# Patient Record
Sex: MALE | Employment: OTHER | URBAN - METROPOLITAN AREA
[De-identification: names, ages, dates, MRNs, and addresses within clinical notes are randomized per-mention and may not be internally consistent; named-entity substitution may affect disease eponyms.]

---

## 2022-08-10 LAB — PSA, EXTERNAL: 6.9

## 2022-09-28 PROBLEM — R97.20 ELEVATED PSA: Status: ACTIVE | Noted: 2022-09-28

## 2022-10-05 ENCOUNTER — OFFICE VISIT (OUTPATIENT)
Dept: UROLOGY | Age: 77
End: 2022-10-05
Payer: MEDICARE

## 2022-10-05 VITALS — BODY MASS INDEX: 28.44 KG/M2 | WEIGHT: 210 LBS | HEIGHT: 72 IN

## 2022-10-05 DIAGNOSIS — R97.20 ELEVATED PSA: ICD-10-CM

## 2022-10-05 DIAGNOSIS — N39.0 URINARY TRACT INFECTION WITH HEMATURIA, SITE UNSPECIFIED: Primary | ICD-10-CM

## 2022-10-05 DIAGNOSIS — R31.9 URINARY TRACT INFECTION WITH HEMATURIA, SITE UNSPECIFIED: Primary | ICD-10-CM

## 2022-10-05 DIAGNOSIS — N40.0 BENIGN PROSTATIC HYPERPLASIA WITHOUT LOWER URINARY TRACT SYMPTOMS: ICD-10-CM

## 2022-10-05 PROBLEM — N52.9 ED (ERECTILE DYSFUNCTION): Status: ACTIVE | Noted: 2022-10-05

## 2022-10-05 LAB
BILIRUB UR QL: NEGATIVE
GLUCOSE UR-MCNC: NEGATIVE MG/DL
KETONES P FAST UR STRIP-MCNC: NEGATIVE MG/DL
PH UR STRIP: 7 [PH] (ref 4.6–8)
PROT UR QL STRIP: NEGATIVE
SP GR UR STRIP: 1.02 (ref 1–1.03)
UA UROBILINOGEN AMB POC: NORMAL (ref 0.2–1)
URINALYSIS CLARITY POC: CLEAR
URINALYSIS COLOR POC: YELLOW
URINE BLOOD POC: NORMAL
URINE LEUKOCYTES POC: NORMAL
URINE NITRITES POC: NEGATIVE

## 2022-10-05 PROCEDURE — 99204 OFFICE O/P NEW MOD 45 MIN: CPT | Performed by: UROLOGY

## 2022-10-05 PROCEDURE — 81003 URINALYSIS AUTO W/O SCOPE: CPT | Performed by: UROLOGY

## 2022-10-05 RX ORDER — ATORVASTATIN CALCIUM 10 MG/1
20 TABLET, FILM COATED ORAL DAILY
COMMUNITY

## 2022-10-05 RX ORDER — HYDROCHLOROTHIAZIDE 12.5 MG/1
12.5 CAPSULE ORAL DAILY
COMMUNITY

## 2022-10-05 RX ORDER — LISINOPRIL 40 MG/1
40 TABLET ORAL DAILY
COMMUNITY

## 2022-10-05 RX ORDER — AMLODIPINE BESYLATE 10 MG/1
TABLET ORAL DAILY
COMMUNITY

## 2022-10-05 RX ORDER — CARVEDILOL 25 MG/1
25 TABLET ORAL 2 TIMES DAILY WITH MEALS
COMMUNITY

## 2022-10-05 RX ORDER — ENEMA 19; 7 G/133ML; G/133ML
1 ENEMA RECTAL
Qty: 133 ML | Refills: 0 | Status: SHIPPED | OUTPATIENT
Start: 2022-10-05 | End: 2022-10-05

## 2022-10-05 RX ORDER — CIPROFLOXACIN 500 MG/1
500 TABLET ORAL 2 TIMES DAILY
Qty: 10 TABLET | Refills: 0 | Status: SHIPPED | OUTPATIENT
Start: 2022-10-05

## 2022-10-05 NOTE — PROGRESS NOTES
HISTORY OF PRESENT ILLNESS  Tim Lassiter is a 78 y.o. male. has a past medical history of Hypercholesterolemia and Hypertension. has a past surgical history that includes hx heent. Chief Complaint   Patient presents with    New Patient    Elevated PSA     He is [facility] in [facility] [place] for an elevated PSA. Notes were reviewed from 8/10/2022. He had annual wellness exam.  PSA was checked for routine screening. IPSS 3/3. Chronic Conditions Addressed Today       1. Elevated PSA     Overview      8/10/22 PSA 6.9  He reports PSA 5.1 1/2022. Current Assessment & Plan      Elevated PSA. He needs evaluation. We will plan a MRI. He will need a biopsy afterward. The patient was counseled on the risks, benefits and expected course of the procedure. He wished to proceed. Relevant Medications     hydroCHLOROthiazide (MICROZIDE) 12.5 mg capsule     lisinopriL (PRINIVIL, ZESTRIL) 40 mg tablet    2. Benign prostatic hyperplasia without lower urinary tract symptoms     Current Assessment & Plan       IPSS 3/3. Some double voiding in the morning. Relevant Medications     hydroCHLOROthiazide (MICROZIDE) 12.5 mg capsule     lisinopriL (PRINIVIL, ZESTRIL) 40 mg tablet     Other Relevant Orders     AMB POC URINALYSIS DIP STICK AUTO W/O MICRO     MRI PELV W WO CONT       Past Medical History:    PMHx (including negatives):  has a past medical history of Hypercholesterolemia and Hypertension. PSurgHx:  has a past surgical history that includes hx heent. PSocHx:  reports that he has quit smoking. His smoking use included cigarettes. He has never used smokeless tobacco. He reports current alcohol use of about 14.0 standard drinks per week. He reports that he does not use drugs. FamilyHX:   Family History   Problem Relation Age of Onset    Cancer Sister      Review of Systems   All other systems reviewed and are negative.   Physical Exam  Constitutional: General: He is not in acute distress. Appearance: Normal appearance. HENT:      Head: Normocephalic and atraumatic. Eyes:      Extraocular Movements: Extraocular movements intact. Pupils: Pupils are equal, round, and reactive to light. Cardiovascular:      Rate and Rhythm: Normal rate and regular rhythm. Pulmonary:      Effort: Pulmonary effort is normal. No respiratory distress. Breath sounds: Normal breath sounds. No wheezing or rhonchi. Genitourinary:     Penis: Normal. No phimosis, hypospadias or tenderness. Testes: Normal.         Right: Mass, tenderness or swelling not present. Left: Mass, tenderness or swelling not present. Epididymis:      Right: Normal. No mass or tenderness. Left: Normal. No mass or tenderness. Prostate: Enlarged. Not tender and no nodules present (2-3+ benign, only lower half palpable). Rectum: Normal.   Musculoskeletal:         General: Normal range of motion. Lymphadenopathy:      Cervical: No cervical adenopathy. Upper Body:      Right upper body: No supraclavicular adenopathy. Left upper body: No supraclavicular adenopathy. Skin:     General: Skin is warm and dry. Neurological:      General: No focal deficit present. Mental Status: He is alert and oriented to person, place, and time. Psychiatric:         Mood and Affect: Mood normal.         Behavior: Behavior normal.     No Known Allergies   Prior to Admission medications    Medication Sig Start Date End Date Taking? Authorizing Provider   hydroCHLOROthiazide (MICROZIDE) 12.5 mg capsule Take 12.5 mg by mouth daily. Yes Provider, Historical   amLODIPine (NORVASC) 10 mg tablet Take  by mouth daily. Yes Provider, Historical   carvediloL (COREG) 25 mg tablet Take 25 mg by mouth two (2) times daily (with meals). Yes Provider, Historical   lisinopriL (PRINIVIL, ZESTRIL) 40 mg tablet Take 40 mg by mouth daily.    Yes Provider, Historical   atorvastatin (LIPITOR) 10 mg tablet Take 20 mg by mouth daily. Yes Provider, Historical   ciprofloxacin HCl (CIPRO) 500 mg tablet Take 1 Tablet by mouth two (2) times a day. Start the day before the procedure. 10/5/22  Yes Mahendra Martínez MD   sodium phosphate (Fleet Enema) 19-7 gram/118 mL enema Insert 1 Enema into rectum now for 1 dose. Use 1 to 4 hrs prior to the procedure. 10/5/22 10/5/22 Yes Mahendra Martínez MD        ASSESSMENT and PLAN  Diagnoses and all orders for this visit:    1. Elevated PSA  Assessment & Plan:  Elevated PSA. He needs evaluation. We will plan a MRI. He will need a biopsy afterward. The patient was counseled on the risks, benefits and expected course of the procedure. He wished to proceed. 2. Benign prostatic hyperplasia without lower urinary tract symptoms  Assessment & Plan:   IPSS 3/3. Some double voiding in the morning. Orders:  -     AMB POC URINALYSIS DIP STICK AUTO W/O MICRO  -     MRI PELV W WO CONT; Future    Other orders  -     ciprofloxacin HCl (CIPRO) 500 mg tablet; Take 1 Tablet by mouth two (2) times a day. Start the day before the procedure. -     sodium phosphate (Fleet Enema) 19-7 gram/118 mL enema; Insert 1 Enema into rectum now for 1 dose. Use 1 to 4 hrs prior to the procedure. Follow-up and Dispositions    Return for prostate MRI, prostate biopsy.        Pati Moreno MD

## 2022-10-05 NOTE — LETTER
10/5/2022    Patient: Juan Bella   YOB: 1945   Date of Visit: 10/5/2022     [facility] [address] [place] [id number]  Via Fax: [phone]    Dear JOYCE Suggs,      Thank you for referring Mr. Juan Bella to  [facility] for evaluation. My notes for this consultation are attached. If you have questions, please do not hesitate to call me. I look forward to following your patient along with you.       Sincerely,    Jair Last MD

## 2022-10-05 NOTE — ASSESSMENT & PLAN NOTE
Elevated PSA. He needs evaluation. We will plan a MRI. He will need a biopsy afterward. The patient was counseled on the risks, benefits and expected course of the procedure. He wished to proceed.

## 2022-10-05 NOTE — PROGRESS NOTES
Chief Complaint   Patient presents with    New Patient    Elevated PSA     1. Have you been to the ER, urgent care clinic since your last visit? Hospitalized since your last visit? No    2. Have you seen or consulted any other health care providers outside of the [address] [place] [address] since your last visit? Include any pap smears or colon screening.  No  Visit Vitals  Ht 6' (1.829 m)   Wt 210 lb (95.3 kg)   BMI 28.48 kg/m²

## 2022-10-09 LAB
APPEARANCE UR: CLEAR
BACTERIA #/AREA URNS HPF: NORMAL /[HPF]
BACTERIA UR CULT: NORMAL
BILIRUB UR QL STRIP: NEGATIVE
CASTS URNS QL MICRO: NORMAL /LPF
COLOR UR: YELLOW
EPI CELLS #/AREA URNS HPF: NORMAL /HPF (ref 0–10)
GLUCOSE UR QL STRIP: NEGATIVE
HGB UR QL STRIP: ABNORMAL
KETONES UR QL STRIP: NEGATIVE
LEUKOCYTE ESTERASE UR QL STRIP: NEGATIVE
MICRO URNS: ABNORMAL
NITRITE UR QL STRIP: NEGATIVE
PH UR STRIP: 8 [PH] (ref 5–7.5)
PROT UR QL STRIP: NEGATIVE
RBC #/AREA URNS HPF: NORMAL /HPF (ref 0–2)
SP GR UR STRIP: 1.01 (ref 1–1.03)
UROBILINOGEN UR STRIP-MCNC: 0.2 MG/DL (ref 0.2–1)
WBC #/AREA URNS HPF: NORMAL /HPF (ref 0–5)

## 2022-10-22 ENCOUNTER — HOSPITAL ENCOUNTER (OUTPATIENT)
Dept: MRI IMAGING | Age: 77
Discharge: HOME | End: 2022-10-22
Attending: UROLOGY
Payer: MEDICARE

## 2022-10-22 DIAGNOSIS — N40.0 BENIGN PROSTATIC HYPERPLASIA WITHOUT LOWER URINARY TRACT SYMPTOMS: ICD-10-CM

## 2022-10-22 PROCEDURE — A9576 INJ PROHANCE MULTIPACK: HCPCS

## 2022-10-22 PROCEDURE — 72197 MRI PELVIS W/O & W/DYE: CPT

## 2022-10-22 PROCEDURE — 74011000258 HC RX REV CODE- 258: Performed by: UROLOGY

## 2022-10-22 PROCEDURE — 74011000250 HC RX REV CODE- 250: Performed by: UROLOGY

## 2022-10-22 PROCEDURE — 74011250636 HC RX REV CODE- 250/636

## 2022-10-22 RX ORDER — SODIUM CHLORIDE 0.9 % (FLUSH) 0.9 %
10 SYRINGE (ML) INJECTION
Status: COMPLETED | OUTPATIENT
Start: 2022-10-22 | End: 2022-10-22

## 2022-10-22 RX ADMIN — SODIUM CHLORIDE 100 ML: 900 INJECTION, SOLUTION INTRAVENOUS at 09:00

## 2022-10-22 RX ADMIN — GADOTERIDOL 20 ML: 279.3 INJECTION, SOLUTION INTRAVENOUS at 08:34

## 2022-10-22 RX ADMIN — SODIUM CHLORIDE, PRESERVATIVE FREE 10 ML: 5 INJECTION INTRAVENOUS at 09:00

## 2022-10-26 ENCOUNTER — TELEPHONE (OUTPATIENT)
Dept: UROLOGY | Age: 77
End: 2022-10-26

## 2022-10-26 NOTE — TELEPHONE ENCOUNTER
Patient called asking if Dr. Ole Walters has reviewed his MRI from Saturday, explained Dr. Ole Walters has been on vacation and has not reviewed it yet, likely will review it by the end of this week, and we will go from there to schedule a follow up visit.

## 2022-10-26 NOTE — TELEPHONE ENCOUNTER
MRI of the prostate did not reveal concerning lesions. He has a rising PSA. I called him about his results. There was no answer on his mobile number. I left message that I would still recommend proceeding to a prostate biopsy to evaluate his rising PSA. He should call the office to discuss it further.

## 2022-11-09 NOTE — PATIENT INSTRUCTIONS
Prostate Biopsy: After Care    What can I expect after a prostate biopsy? After the biopsy it is normal to experience the following sensations or symptoms:    Burning with urination - It is normal to feel burning with urination for the first 24 hours after the biopsy. It may continue for up to three days. Frequent urination - This will gradually improve over the first 24 to 36 hours. Blood in the urine - It is normal to have slightly red tinged urine or urine that resembles a maria luz or red wine color. This may last from 12 hours to 3 weeks after the biopsy. Blood in stool - You may notice red stains on the toilet tissue or see some bloody streaks in your stool. This may last for up to 5 days. Blood in the semen - this may persist for up to 6 weeks after your biopsy. MEDICATIONS:   You may have a prescription for an antibiotic. You will need to pick that up from the pharmacy and take it as directed. Typically, Dr. Ilene Castro likes you to start taking this the day before your procedure. If you have problems tolerating the medication, please call the office to let us know. Hold any aspirin or blood thinners as directed. The surgery scheduler should have reviewed this with you when you were scheduled for your biopsy. If you have questions, give her a call at [phone]. NSAID's like ibuprofen can also cause additional bleeding. Use Tylenol only for pain. How should I care for myself after the biopsy? Drink plenty of fluids to prevent blood clots and infection in the bladder. It is important that you hydrate well. Avoid strenuous exercise such as jogging, heavy lifting, golfing, and bike riding for at least 7 days. Take your antibiotics as directed and complete the full dose given. Do not drink alcoholic beverages until after completing your antibiotics. Do not take aspirin and anti-inflammatory products such as Celebrex for 1 (one) week following prostate biopsy.   Use Tylenol for pain.  Avoid sexual activity for 7 days. When should I call my doctor? Call the office if you have any of the following signs and symptoms. Persistent urinary frequency or burning. Fever of 101 degrees or greater. Urine that is cherry-red or has clots in it. Or you are unable to urinate. Persistent heavy bleeding or clots lasting longer than 7 days.

## 2022-11-09 NOTE — ASSESSMENT & PLAN NOTE
He is s/p prostate biopsy today. He tolerated the procedure well. Post-operative instructions were given to the patient verbally and on the after visit summary. He will follow up in approximately one week to receive biopsy results.

## 2022-11-10 ENCOUNTER — OFFICE VISIT (OUTPATIENT)
Dept: UROLOGY | Age: 77
End: 2022-11-10
Payer: MEDICARE

## 2022-11-10 VITALS — WEIGHT: 210 LBS | OXYGEN SATURATION: 100 % | TEMPERATURE: 98.1 F | BODY MASS INDEX: 28.44 KG/M2 | HEIGHT: 72 IN

## 2022-11-10 DIAGNOSIS — R97.20 ELEVATED PSA: Primary | ICD-10-CM

## 2022-11-10 PROCEDURE — 76872 US TRANSRECTAL: CPT | Performed by: UROLOGY

## 2022-11-10 PROCEDURE — 55700 PR BIOPSY OF PROSTATE,NEEDLE/PUNCH: CPT | Performed by: UROLOGY

## 2022-11-10 RX ORDER — CEFTRIAXONE 1 G/1
1 INJECTION, POWDER, FOR SOLUTION INTRAMUSCULAR; INTRAVENOUS EVERY 24 HOURS
Status: DISCONTINUED | OUTPATIENT
Start: 2022-11-10 | End: 2022-11-17

## 2022-11-10 NOTE — LETTER
11/10/2022    Patient: Diana Johnson   YOB: 1945   Date of Visit: 11/10/2022     Maribeth Hernandez  [address] [place] [address] Dr [place]ouna Mccarthy [address]  Via Fax: [phone]    Dear JOYCE Hernandez,      Thank you for referring Mr. Diana Johnson to [place] [address] for evaluation. My notes for this consultation are attached. If you have questions, please do not hesitate to call me. I look forward to following your patient along with you.       Sincerely,    Melissa Richard MD

## 2022-11-10 NOTE — PROGRESS NOTES
Prostate biopsy / transrectal ultrasound guidance/rectal ultrasonography    Indications: Elevated PSA   Elevated PSA. It was 6.9 on 8/10/2022. MRI 10/22/2022 with no evidence of immediate or high-grade prostate adenocarcinoma. He did have an enlarged prostate. He is here today for further evaluation via biopsy. Lab Results   Component Value Date/Time    Prostate Specific Ag 10.5 (H) 11/10/2022 04:54 PM    PSA, External 6.9 08/10/2022 12:00 AM        Prep: Cipro 500mg PO and Fleets enema   Ceftriaxone 1 g IM administered before the procedure. Description: Informed consent was obtained and signed. A digital rectal exam was performed. The patient was placed in the left lateral decubitis position with hips flexed. The multiplanar transrectal ultrasound probe was introduced and multiplanar echography was performed. Imaging in transverse and longitudinal views were done. TRUS prostate measurements (cm)  Length: 5.28   Height:  6.36  Width: 4.94  Prostate volume (0.52 x L X W X H):  86.49 cc    Documentation images were taken. The bladder exam : Normal   There were prostate calcifications noted. Suspicious area: There were no suspicious hypoechoic areas noted. Prostate block was performed with 1% lidocaine  at the apex and base bilaterally for a total of 6 cc. Using a 18g core biopsy needle, samples were taken from apex to the base laterally and more medially in the standard 12 zone pattern. Total number of biopsies taken: 14  Specimens were submitted in 12 specimens     The patient tolerated well. Post-operative instructions were given to the patient in detail per physician and post-op instruction sheet given to patient. Patient will follow up to return to the office in one week to receive biopsy results.

## 2022-11-10 NOTE — PROGRESS NOTES
Chief Complaint   Patient presents with    Biopsy    Elevated PSA       PHQ-9 score is    Negative    Vitals:    11/10/22 1505   Temp: 98.1 °F (36.7 °C)   TempSrc: Temporal   SpO2: 100%   Weight: 210 lb (95.3 kg)   Height: 6' (1.829 m)   PainSc:   0 - No pain        1. \"Have you been to the ER, urgent care clinic since your last visit? Hospitalized since your last visit? \" No    2. \"Have you seen or consulted any other health care providers outside of the [address] [place] [address] since your last visit? \" No     3. For patients aged 39-70: Has the patient had a colonoscopy / FIT/ Cologuard? No      If the patient is female:    4. For patients aged 41-77: Has the patient had a mammogram within the past 2 years? No      5. For patients aged 21-65: Has the patient had a pap smear?  No

## 2022-11-11 RX ADMIN — CEFTRIAXONE 1 G: 1 INJECTION, POWDER, FOR SOLUTION INTRAMUSCULAR; INTRAVENOUS at 13:20

## 2022-11-12 LAB — PSA SERPL-MCNC: 10.5 NG/ML (ref 0–4)

## 2022-11-21 PROBLEM — N42.32 ATYPICAL SMALL ACINAR PROLIFERATION OF PROSTATE: Status: ACTIVE | Noted: 2022-11-21

## 2022-11-21 PROBLEM — N42.31 PIN (PROSTATIC INTRAEPITHELIAL NEOPLASIA): Status: ACTIVE | Noted: 2022-11-21

## 2022-11-23 ENCOUNTER — OFFICE VISIT (OUTPATIENT)
Dept: UROLOGY | Age: 77
End: 2022-11-23
Payer: MEDICARE

## 2022-11-23 VITALS — WEIGHT: 213 LBS | HEIGHT: 72 IN | BODY MASS INDEX: 28.85 KG/M2

## 2022-11-23 DIAGNOSIS — R97.20 ELEVATED PSA: ICD-10-CM

## 2022-11-23 DIAGNOSIS — N40.0 BENIGN PROSTATIC HYPERPLASIA WITHOUT LOWER URINARY TRACT SYMPTOMS: ICD-10-CM

## 2022-11-23 DIAGNOSIS — N42.32 ATYPICAL SMALL ACINAR PROLIFERATION OF PROSTATE: ICD-10-CM

## 2022-11-23 DIAGNOSIS — N42.31 PIN (PROSTATIC INTRAEPITHELIAL NEOPLASIA): Primary | ICD-10-CM

## 2022-11-23 RX ORDER — FINASTERIDE 5 MG/1
5 TABLET, FILM COATED ORAL DAILY
Qty: 90 TABLET | Refills: 3 | Status: SHIPPED | OUTPATIENT
Start: 2022-11-23

## 2022-11-23 NOTE — PROGRESS NOTES
HISTORY OF PRESENT ILLNESS  Neena Childers is a 78 y.o. male. has a past medical history of Hypercholesterolemia and Hypertension. has a past surgical history that includes hx heent and hx prostate surgery (11/10/2022). Chief Complaint   Patient presents with    Biopsy Results     HPI  Chronic Conditions Addressed Today       1. Elevated PSA     Overview      11/10/22 10.5  8/10/22 PSA 6.9  He reports PSA 5.1 on 1/2022. MRI 10/22/22: No evidence of intermediate or high-grade prostate adenocarcinoma. Enlarged prostate with benign appearing prostate hypertrophy. He is s/p prostate biopsy on 11/10/22. Pathology significant for 2 cores of HGPIN and one core with ASAP. Current Assessment & Plan      PSA likely reflects large prostate size. No concerns. Follow trends. Relevant Medications     finasteride (PROSCAR) 5 mg tablet     Other Relevant Orders     PSA, DIAGNOSTIC (PROSTATE SPECIFIC AG)     PSA, DIAGNOSTIC (PROSTATE SPECIFIC AG)    2. Benign prostatic hyperplasia without lower urinary tract symptoms     Overview      Double voiding in the morning. Otherwise asymptomatic. Current Assessment & Plan       Not bothered by his voiding. 86.49 cc prostate volume. We discussed adding finasteride. The patient was instructed on the dosing of the medication and reason for taking it. We discussed the common and serious risks. The patient is advised to be cautious of side effects with a new medication. Relevant Medications     finasteride (PROSCAR) 5 mg tablet    3. PIN (prostatic intraepithelial neoplasia) - Primary     Overview      He is s/p prostate biopsy on 11/10/22. Pathology significant for 2 cores of HGPIN and one core with ASAP. Current Assessment & Plan      PIN/ Atypia without cancer          Relevant Medications     finasteride (PROSCAR) 5 mg tablet    4.  Atypical small acinar proliferation of prostate     Overview      He is s/p prostate biopsy on 11/10/22. Pathology significant for 2 cores of HGPIN and one core with ASAP. Current Assessment & Plan      Atypical cells but no cancer on biopsy          Relevant Medications     finasteride (PROSCAR) 5 mg tablet       Past Medical History:    PMHx (including negatives):  has a past medical history of Hypercholesterolemia and Hypertension. PSurgHx:  has a past surgical history that includes hx heent and hx prostate surgery (11/10/2022). PSocHx:  reports that he has quit smoking. His smoking use included cigarettes. He has never used smokeless tobacco. He reports current alcohol use of about 14.0 standard drinks per week. He reports that he does not use drugs. FamilyHX:   Family History   Problem Relation Age of Onset    Cancer Sister      ROS  Physical Exam  No Known Allergies  Current Outpatient Medications   Medication Sig Dispense Refill    finasteride (PROSCAR) 5 mg tablet Take 1 Tablet by mouth daily. 90 Tablet 3    hydroCHLOROthiazide (MICROZIDE) 12.5 mg capsule Take 12.5 mg by mouth daily. amLODIPine (NORVASC) 10 mg tablet Take  by mouth daily. carvediloL (COREG) 25 mg tablet Take 25 mg by mouth two (2) times daily (with meals). lisinopriL (PRINIVIL, ZESTRIL) 40 mg tablet Take 40 mg by mouth daily. atorvastatin (LIPITOR) 10 mg tablet Take 20 mg by mouth daily. Results for orders placed or performed in visit on 11/10/22   PSA, DIAGNOSTIC (PROSTATE SPECIFIC AG)   Result Value Ref Range    Prostate Specific Ag 10.5 (H) 0.0 - 4.0 ng/mL       ASSESSMENT and PLAN  Diagnoses and all orders for this visit:    1. PIN (prostatic intraepithelial neoplasia)  Assessment & Plan:  PIN/ Atypia without cancer      2. Atypical small acinar proliferation of prostate  Assessment & Plan:  Atypical cells but no cancer on biopsy      3. Elevated PSA  Assessment & Plan:  PSA likely reflects large prostate size. No concerns. Follow trends.      Orders:  -     PSA, DIAGNOSTIC (PROSTATE SPECIFIC AG); Future  -     PSA, DIAGNOSTIC (PROSTATE SPECIFIC AG); Future    4. Benign prostatic hyperplasia without lower urinary tract symptoms  Assessment & Plan:   Not bothered by his voiding. 86.49 cc prostate volume. We discussed adding finasteride. The patient was instructed on the dosing of the medication and reason for taking it. We discussed the common and serious risks. The patient is advised to be cautious of side effects with a new medication. Other orders  -     finasteride (PROSCAR) 5 mg tablet; Take 1 Tablet by mouth daily. Follow-up and Dispositions    Return in about 6 months (around 5/23/2023) for PSA prior.        Jose Martin Rojo MD

## 2022-11-23 NOTE — LETTER
11/23/2022    Patient: Amaya Ruiz   YOB: 1945   Date of Visit: 11/23/2022     Anne Murcia, [address] [place] [address]  Via Fax: [phone]    Dear JOYCE Cody,      Thank you for referring Mr. Amaya Ruiz to [facility] for evaluation. My notes for this consultation are attached. If you have questions, please do not hesitate to call me. I look forward to following your patient along with you.       Sincerely,    Katherine Gooden MD

## 2022-11-23 NOTE — ASSESSMENT & PLAN NOTE
Not bothered by his voiding. 86.49 cc prostate volume. We discussed adding finasteride. The patient was instructed on the dosing of the medication and reason for taking it. We discussed the common and serious risks. The patient is advised to be cautious of side effects with a new medication.

## 2022-11-23 NOTE — PROGRESS NOTES
Chief Complaint   Patient presents with    Biopsy Results     1. Have you been to the ER, urgent care clinic since your last visit? Hospitalized since your last visit? No    2. Have you seen or consulted any other health care providers outside of the [address] [place] [address] since your last visit? Include any pap smears or colon screening.  No  Visit Vitals  Ht 6' (1.829 m)   Wt 213 lb (96.6 kg)   BMI 28.89 kg/m²

## 2023-04-22 DIAGNOSIS — R97.20 ELEVATED PSA: Primary | ICD-10-CM

## 2023-05-12 ENCOUNTER — TELEPHONE (OUTPATIENT)
Age: 78
End: 2023-05-12

## 2023-05-12 DIAGNOSIS — R97.20 ELEVATED PSA: ICD-10-CM

## 2023-05-12 DIAGNOSIS — R97.20 ELEVATED PSA: Primary | ICD-10-CM

## 2023-05-12 PROBLEM — N42.31 PIN (PROSTATIC INTRAEPITHELIAL NEOPLASIA): Status: ACTIVE | Noted: 2022-11-21

## 2023-05-12 PROBLEM — N52.9 ED (ERECTILE DYSFUNCTION): Status: RESOLVED | Noted: 2022-10-05 | Resolved: 2023-05-12

## 2023-05-12 PROBLEM — N42.32 ATYPICAL SMALL ACINAR PROLIFERATION OF PROSTATE: Status: ACTIVE | Noted: 2022-11-21

## 2023-05-24 ENCOUNTER — OFFICE VISIT (OUTPATIENT)
Age: 78
End: 2023-05-24
Payer: MEDICARE

## 2023-05-24 VITALS — BODY MASS INDEX: 28.85 KG/M2 | OXYGEN SATURATION: 100 % | WEIGHT: 213 LBS | TEMPERATURE: 97.8 F | HEIGHT: 72 IN

## 2023-05-24 DIAGNOSIS — N52.2 DRUG-INDUCED ERECTILE DYSFUNCTION: ICD-10-CM

## 2023-05-24 DIAGNOSIS — R97.20 ELEVATED PSA: Primary | ICD-10-CM

## 2023-05-24 DIAGNOSIS — R97.20 ELEVATED PSA: ICD-10-CM

## 2023-05-24 DIAGNOSIS — N42.31 PIN (PROSTATIC INTRAEPITHELIAL NEOPLASIA): ICD-10-CM

## 2023-05-24 DIAGNOSIS — N42.32 ATYPICAL SMALL ACINAR PROLIFERATION OF PROSTATE: ICD-10-CM

## 2023-05-24 DIAGNOSIS — N40.0 BENIGN PROSTATIC HYPERPLASIA WITHOUT LOWER URINARY TRACT SYMPTOMS: ICD-10-CM

## 2023-05-24 PROCEDURE — G8427 DOCREV CUR MEDS BY ELIG CLIN: HCPCS | Performed by: UROLOGY

## 2023-05-24 PROCEDURE — 1123F ACP DISCUSS/DSCN MKR DOCD: CPT | Performed by: UROLOGY

## 2023-05-24 PROCEDURE — G8419 CALC BMI OUT NRM PARAM NOF/U: HCPCS | Performed by: UROLOGY

## 2023-05-24 PROCEDURE — 4004F PT TOBACCO SCREEN RCVD TLK: CPT | Performed by: UROLOGY

## 2023-05-24 PROCEDURE — 99214 OFFICE O/P EST MOD 30 MIN: CPT | Performed by: UROLOGY

## 2023-05-24 RX ORDER — TADALAFIL 20 MG/1
20 TABLET ORAL DAILY PRN
Qty: 30 TABLET | Refills: 1 | Status: SHIPPED | OUTPATIENT
Start: 2023-05-24

## 2023-05-24 NOTE — ASSESSMENT & PLAN NOTE
He has more difficulty with erections since trying finasteride. He has normal libido. We will try tadalafil as needed.

## 2023-05-24 NOTE — PROGRESS NOTES
HISTORY OF PRESENT ILLNESS  Mat Monreal is a 70 y.o. male. has a past medical history of Hypercholesterolemia and Hypertension. has a past surgical history that includes heent and Prostate surgery (11/10/2022). Chief Complaint   Patient presents with    Follow-up    Elevated PSA    Benign Prostatic Hypertrophy     The patient has a history of an elevated PSA. He had a prostate biopsy 11/10/2022 with high-grade PIN and ASAP. He has a large prostate size. Prostate was measured 86.49 cc. We started finasteride in November. He is due for a follow-up PSA. Elevated PSA    Benign Prostatic Hypertrophy    1. Elevated PSA  Overview:  11/10/22 10.5  8/10/22 PSA 6.9  He reports PSA 5.1 on 1/2022. MRI 10/22/22: No evidence of intermediate or high-grade prostate   adenocarcinoma. Enlarged prostate with benign appearing prostate   hypertrophy. He is s/p prostate biopsy on 11/10/22. Pathology significant for 2 cores of HGPIN and one core with ASAP. Assessment & Plan:  History of elevated PSA. High-grade PIN and ASAP. Now on finasteride. Recheck PSA now. Orders:  -     PSA, Diagnostic; Future  -     PSA, Diagnostic; Future  2. PIN (prostatic intraepithelial neoplasia)  Overview:  He is s/p prostate biopsy on 11/10/22. Pathology significant for 2 cores of HGPIN and one core with ASAP. 3. Atypical small acinar proliferation of prostate  Overview:  He is s/p prostate biopsy on 11/10/22. Pathology significant for 2 cores of HGPIN and one core with ASAP. Assessment & Plan:   Atypia and high-grade PIN indicated future risk of detecting prostate cancer. As his age advances, this is less significant. We will continue to monitor his PSA and exam.  4. Benign prostatic hyperplasia without lower urinary tract symptoms  Overview:  Double voiding in the morning. Otherwise asymptomatic. Assessment & Plan:   Large prostate. Started on finasteride November 2022.   He feels his urination is much

## 2023-05-24 NOTE — ASSESSMENT & PLAN NOTE
Atypia and high-grade PIN indicated future risk of detecting prostate cancer. As his age advances, this is less significant.   We will continue to monitor his PSA and exam.

## 2023-05-24 NOTE — ASSESSMENT & PLAN NOTE
Large prostate. Started on finasteride November 2022. He feels his urination is much better. He no longer needs to double void in the morning.   Continue medication

## 2023-05-24 NOTE — PROGRESS NOTES
Chief Complaint   Patient presents with    Follow-up    Elevated PSA    Benign Prostatic Hypertrophy     1. Have you been to the ER, urgent care clinic since your last visit? Hospitalized since your last visit? No    2. Have you seen or consulted any other health care providers outside of the [address] [place] [address] since your last visit? Include any pap smears or colon screening.  No  Temp 97.8 °F (36.6 °C) (Temporal)   Ht 6' (1.829 m)   Wt 213 lb (96.6 kg)   SpO2 100%   BMI 28.89 kg/m²

## 2023-05-25 LAB — PSA SERPL-MCNC: 4.7 NG/ML (ref 0–4)

## 2023-11-04 LAB — PSA SERPL-MCNC: 4.2 NG/ML (ref 0–4)

## 2023-11-07 RX ORDER — FINASTERIDE 5 MG/1
5 TABLET, FILM COATED ORAL DAILY
Qty: 90 TABLET | Refills: 3 | Status: SHIPPED | OUTPATIENT
Start: 2023-11-07

## 2023-11-16 PROBLEM — N40.0 BENIGN PROSTATIC HYPERPLASIA WITHOUT LOWER URINARY TRACT SYMPTOMS: Status: ACTIVE | Noted: 2022-10-05

## 2023-11-24 DIAGNOSIS — R97.20 ELEVATED PSA: ICD-10-CM

## 2023-11-28 ENCOUNTER — OFFICE VISIT (OUTPATIENT)
Age: 78
End: 2023-11-28
Payer: MEDICARE

## 2023-11-28 VITALS
WEIGHT: 219 LBS | BODY MASS INDEX: 29.66 KG/M2 | SYSTOLIC BLOOD PRESSURE: 150 MMHG | HEART RATE: 56 BPM | DIASTOLIC BLOOD PRESSURE: 68 MMHG | RESPIRATION RATE: 18 BRPM | HEIGHT: 72 IN | TEMPERATURE: 97.9 F | OXYGEN SATURATION: 98 %

## 2023-11-28 DIAGNOSIS — N40.0 BENIGN PROSTATIC HYPERPLASIA WITHOUT LOWER URINARY TRACT SYMPTOMS: ICD-10-CM

## 2023-11-28 DIAGNOSIS — N52.2 DRUG-INDUCED ERECTILE DYSFUNCTION: ICD-10-CM

## 2023-11-28 DIAGNOSIS — N42.31 PIN (PROSTATIC INTRAEPITHELIAL NEOPLASIA): ICD-10-CM

## 2023-11-28 DIAGNOSIS — R97.20 ELEVATED PSA: Primary | ICD-10-CM

## 2023-11-28 DIAGNOSIS — N42.32 ATYPICAL SMALL ACINAR PROLIFERATION OF PROSTATE: ICD-10-CM

## 2023-11-28 PROCEDURE — G8419 CALC BMI OUT NRM PARAM NOF/U: HCPCS | Performed by: UROLOGY

## 2023-11-28 PROCEDURE — G8427 DOCREV CUR MEDS BY ELIG CLIN: HCPCS | Performed by: UROLOGY

## 2023-11-28 PROCEDURE — 1123F ACP DISCUSS/DSCN MKR DOCD: CPT | Performed by: UROLOGY

## 2023-11-28 PROCEDURE — 4004F PT TOBACCO SCREEN RCVD TLK: CPT | Performed by: UROLOGY

## 2023-11-28 PROCEDURE — 99214 OFFICE O/P EST MOD 30 MIN: CPT | Performed by: UROLOGY

## 2023-11-28 PROCEDURE — G8484 FLU IMMUNIZE NO ADMIN: HCPCS | Performed by: UROLOGY

## 2023-11-28 RX ORDER — TAMSULOSIN HYDROCHLORIDE 0.4 MG/1
0.4 CAPSULE ORAL DAILY
Qty: 90 CAPSULE | Refills: 3 | Status: SHIPPED | OUTPATIENT
Start: 2023-11-28

## 2023-11-28 RX ORDER — CELECOXIB 200 MG/1
200 CAPSULE ORAL DAILY
COMMUNITY
Start: 2023-10-05

## 2023-11-28 NOTE — ASSESSMENT & PLAN NOTE
Tadalafil has not helped. He is not frustrated. His wife is more concerned. He is not sure he wants to change medication.

## 2023-11-28 NOTE — ASSESSMENT & PLAN NOTE
He denies bothersome symptoms. He does note sexual dysfunction which may be related to finasteride. We discussed holding this and trying tamsulosin. The patient was instructed on the dosing of the medication and reason for taking it. We discussed the common and serious risks. The patient is advised to be cautious of side effects with a new medication. He will discuss his situation with his wife and consider starting tamsulosin instead of finasteride. Rx given. Alternative to medication is a surgical procedure for BPH. We discussed a [id number] 4506 laser enucleation of the prostate    He will consider this as well.

## 2023-11-28 NOTE — ASSESSMENT & PLAN NOTE
Atypia/ PIN. Not an immediate concern. Risk of developing prostate cancer exists with balancing concern as he ages. Observe.

## 2024-01-02 ENCOUNTER — TELEPHONE (OUTPATIENT)
Age: 79
End: 2024-01-02

## 2024-01-02 DIAGNOSIS — N40.0 BENIGN PROSTATIC HYPERPLASIA WITHOUT LOWER URINARY TRACT SYMPTOMS: Primary | ICD-10-CM

## 2024-01-02 RX ORDER — FINASTERIDE 5 MG/1
5 TABLET, FILM COATED ORAL DAILY
Qty: 90 TABLET | Refills: 0 | Status: SHIPPED | OUTPATIENT
Start: 2024-01-02

## 2024-02-28 ENCOUNTER — OFFICE VISIT (OUTPATIENT)
Age: 79
End: 2024-02-28
Payer: MEDICARE

## 2024-02-28 VITALS
SYSTOLIC BLOOD PRESSURE: 144 MMHG | BODY MASS INDEX: 29.39 KG/M2 | RESPIRATION RATE: 16 BRPM | DIASTOLIC BLOOD PRESSURE: 79 MMHG | HEART RATE: 63 BPM | WEIGHT: 217 LBS | HEIGHT: 72 IN | OXYGEN SATURATION: 97 %

## 2024-02-28 DIAGNOSIS — N42.31 PIN (PROSTATIC INTRAEPITHELIAL NEOPLASIA): ICD-10-CM

## 2024-02-28 DIAGNOSIS — N40.0 BENIGN PROSTATIC HYPERPLASIA WITHOUT LOWER URINARY TRACT SYMPTOMS: ICD-10-CM

## 2024-02-28 DIAGNOSIS — N42.32 ATYPICAL SMALL ACINAR PROLIFERATION OF PROSTATE: ICD-10-CM

## 2024-02-28 DIAGNOSIS — N52.2 DRUG-INDUCED ERECTILE DYSFUNCTION: ICD-10-CM

## 2024-02-28 DIAGNOSIS — R97.20 ELEVATED PSA: Primary | ICD-10-CM

## 2024-02-28 PROCEDURE — G8419 CALC BMI OUT NRM PARAM NOF/U: HCPCS | Performed by: UROLOGY

## 2024-02-28 PROCEDURE — G8427 DOCREV CUR MEDS BY ELIG CLIN: HCPCS | Performed by: UROLOGY

## 2024-02-28 PROCEDURE — 99214 OFFICE O/P EST MOD 30 MIN: CPT | Performed by: UROLOGY

## 2024-02-28 PROCEDURE — G8484 FLU IMMUNIZE NO ADMIN: HCPCS | Performed by: UROLOGY

## 2024-02-28 PROCEDURE — 1036F TOBACCO NON-USER: CPT | Performed by: UROLOGY

## 2024-02-28 PROCEDURE — 1123F ACP DISCUSS/DSCN MKR DOCD: CPT | Performed by: UROLOGY

## 2024-02-28 RX ORDER — FINASTERIDE 5 MG/1
5 TABLET, FILM COATED ORAL DAILY
Qty: 90 TABLET | Refills: 3 | Status: SHIPPED | OUTPATIENT
Start: 2024-02-28

## 2024-02-28 RX ORDER — DICLOFENAC SODIUM 75 MG/1
TABLET, DELAYED RELEASE ORAL
COMMUNITY
Start: 2024-01-15

## 2024-02-28 RX ORDER — CELECOXIB 200 MG/1
CAPSULE ORAL
COMMUNITY

## 2024-02-28 NOTE — ASSESSMENT & PLAN NOTE
He is not functional.  Tadalafil did not help.  He is not interested in other treatment at this time.

## 2024-02-28 NOTE — PROGRESS NOTES
Chief Complaint   Patient presents with    Elevated PSA    Medication Problem     Flomax: headache, congestion, light headed.      1. Have you been to the ER, urgent care clinic since your last visit?  Hospitalized since your last visit?No    2. Have you seen or consulted any other health care providers outside of the [facility] since your last visit?  Include any pap smears or colon screening. No  BP (!) 144/79 (Site: Right Upper Arm, Position: Sitting, Cuff Size: Medium Adult)   Pulse 63   Resp 16   Ht 1.829 m (6')   Wt 98.4 kg (217 lb)   SpO2 97%   BMI 29.43 kg/m²

## 2024-02-28 NOTE — PROGRESS NOTES
HISTORY OF PRESENT ILLNESS  Jairon Rice is a 79 y.o. male.   has a past medical history of Hypercholesterolemia and Hypertension.  has a past surgical history that includes heent and Prostate surgery (11/10/2022).  Chief Complaint   Patient presents with    Elevated PSA    Medication Problem     Flomax: headache, congestion, light headed.      He has history of an elevated PSA.  He had a benign appearing MRI with large prostate October 2022.  Prior prostate biopsy in room 2022 revealed high-grade PIN.  He was started on finasteride with a decrease in his PSA.  His PSA has trended down from 10.5 down to 4.2 on 11/3/2023.  He had improved voiding on finasteride.    He has had more difficulty with erections since starting finasteride.   He wanted to hold finasteride at last visit to assess his sexual function.  Tamsulosin caused orthostasis.  He stopped this and resumed finasteride.     He did not note an improvement on tadalafil.    Elevated PSA        1. Elevated PSA  Overview:  11/10/22 10.5  8/10/22 PSA 6.9  He reports PSA 5.1 on 1/2022.   MRI 10/22/22: No evidence of intermediate or high-grade prostate adenocarcinoma. Enlarged prostate with benign appearing prostate hypertrophy.  He is s/p prostate biopsy on 11/10/22.  Pathology significant for 2 cores of HGPIN and one core with ASAP.  PSA was 10.5 same date.  5/24/23: 4.7  11/3/23: 4.2    Assessment & Plan:  PSA had decreased on finasteride.  Likely to rebound off of finasteride.  Orders:  -     PSA, Diagnostic; Future  2. Benign prostatic hyperplasia without lower urinary tract symptoms  Overview:  Large prostate, voiding better on finasteride.    Assessment & Plan:  He was voiding better on finasteride but noted sexual dysfunction.  Finasteride was held in November.  He was started on tamsulosin at that time.  He could tolerate it.  He went back on finasteride.   Orders:  -     finasteride (PROSCAR) 5 MG tablet; Take 1 tablet by mouth daily, Disp-90

## 2024-02-28 NOTE — ASSESSMENT & PLAN NOTE
He was voiding better on finasteride but noted sexual dysfunction.  Finasteride was held in November.  He was started on tamsulosin at that time.  He could tolerate it.  He went back on finasteride.

## 2024-02-28 NOTE — ASSESSMENT & PLAN NOTE
Atypia and PIN on prior biopsy in 2022.  As he ages, his risks from clinically significant disease decrease.

## 2024-08-08 LAB — PSA SERPL-MCNC: 5.4 NG/ML (ref 0–4)

## 2024-08-28 ENCOUNTER — OFFICE VISIT (OUTPATIENT)
Age: 79
End: 2024-08-28
Payer: MEDICARE

## 2024-08-28 VITALS
DIASTOLIC BLOOD PRESSURE: 89 MMHG | HEART RATE: 75 BPM | HEIGHT: 72 IN | WEIGHT: 223 LBS | BODY MASS INDEX: 30.2 KG/M2 | SYSTOLIC BLOOD PRESSURE: 163 MMHG

## 2024-08-28 DIAGNOSIS — N40.0 BENIGN PROSTATIC HYPERPLASIA WITHOUT LOWER URINARY TRACT SYMPTOMS: ICD-10-CM

## 2024-08-28 DIAGNOSIS — N52.2 DRUG-INDUCED ERECTILE DYSFUNCTION: ICD-10-CM

## 2024-08-28 DIAGNOSIS — N42.32 ATYPICAL SMALL ACINAR PROLIFERATION OF PROSTATE: ICD-10-CM

## 2024-08-28 DIAGNOSIS — N42.31 PIN (PROSTATIC INTRAEPITHELIAL NEOPLASIA): ICD-10-CM

## 2024-08-28 DIAGNOSIS — R97.20 ELEVATED PSA: Primary | ICD-10-CM

## 2024-08-28 DIAGNOSIS — R97.20 ELEVATED PSA: ICD-10-CM

## 2024-08-28 PROCEDURE — G8417 CALC BMI ABV UP PARAM F/U: HCPCS | Performed by: UROLOGY

## 2024-08-28 PROCEDURE — G8427 DOCREV CUR MEDS BY ELIG CLIN: HCPCS | Performed by: UROLOGY

## 2024-08-28 PROCEDURE — 1123F ACP DISCUSS/DSCN MKR DOCD: CPT | Performed by: UROLOGY

## 2024-08-28 PROCEDURE — 99213 OFFICE O/P EST LOW 20 MIN: CPT | Performed by: UROLOGY

## 2024-08-28 PROCEDURE — 1036F TOBACCO NON-USER: CPT | Performed by: UROLOGY

## 2024-08-28 NOTE — PROGRESS NOTES
Chief Complaint   Patient presents with    Follow-up    Elevated PSA     1. Have you been to the ER, urgent care clinic since your last visit?  Hospitalized since your last visit?No    2. Have you seen or consulted any other health care providers outside of the [facility] since your last visit?  Include any pap smears or colon screening. No    BP (!) 163/89 (Site: Right Upper Arm, Position: Sitting, Cuff Size: Medium Adult)   Pulse 75   Ht 1.829 m (6')   Wt 101.2 kg (223 lb)   BMI 30.24 kg/m²     
Total and Free; Future  3. PIN (prostatic intraepithelial neoplasia)  -     PSA, Total and Free; Future  4. Atypical small acinar proliferation of prostate  Assessment & Plan:   H/o HGPIN and ASAP.  If PSA continues to rise consider further evaluation  Orders:  -     PSA, Total and Free; Future  5. Drug-induced erectile dysfunction  Assessment & Plan:   No longer active.  Not an issue.       No follow-ups on file.   Wilfredo Addison MD       Please note that portions of this note was potentially completed with Dragon dictation, the computer voice recognition software.  Quite often unanticipated grammatical, syntax, homophones, and other interpretive errors are inadvertently transcribed by the computer software.  Please disregard these errors.  Please excuse any errors that have escaped final proofreading.  Thank you.

## 2025-01-29 LAB
PSA FREE MFR SERPL: 6.8 %
PSA FREE SERPL-MCNC: 0.4 NG/ML
PSA SERPL-MCNC: 5.9 NG/ML (ref 0–4)

## 2025-02-26 ENCOUNTER — OFFICE VISIT (OUTPATIENT)
Age: 80
End: 2025-02-26
Payer: MEDICARE

## 2025-02-26 VITALS
BODY MASS INDEX: 30.2 KG/M2 | WEIGHT: 223 LBS | HEIGHT: 72 IN | HEART RATE: 58 BPM | SYSTOLIC BLOOD PRESSURE: 153 MMHG | DIASTOLIC BLOOD PRESSURE: 73 MMHG

## 2025-02-26 DIAGNOSIS — R97.20 ELEVATED PSA: Primary | ICD-10-CM

## 2025-02-26 DIAGNOSIS — N42.31 PIN (PROSTATIC INTRAEPITHELIAL NEOPLASIA): ICD-10-CM

## 2025-02-26 DIAGNOSIS — N42.32 ATYPICAL SMALL ACINAR PROLIFERATION OF PROSTATE: ICD-10-CM

## 2025-02-26 DIAGNOSIS — N40.0 BENIGN PROSTATIC HYPERPLASIA WITHOUT LOWER URINARY TRACT SYMPTOMS: ICD-10-CM

## 2025-02-26 PROCEDURE — 1126F AMNT PAIN NOTED NONE PRSNT: CPT | Performed by: UROLOGY

## 2025-02-26 PROCEDURE — 99214 OFFICE O/P EST MOD 30 MIN: CPT | Performed by: UROLOGY

## 2025-02-26 PROCEDURE — 1159F MED LIST DOCD IN RCRD: CPT | Performed by: UROLOGY

## 2025-02-26 PROCEDURE — G8427 DOCREV CUR MEDS BY ELIG CLIN: HCPCS | Performed by: UROLOGY

## 2025-02-26 PROCEDURE — 1036F TOBACCO NON-USER: CPT | Performed by: UROLOGY

## 2025-02-26 PROCEDURE — G8417 CALC BMI ABV UP PARAM F/U: HCPCS | Performed by: UROLOGY

## 2025-02-26 PROCEDURE — 1123F ACP DISCUSS/DSCN MKR DOCD: CPT | Performed by: UROLOGY

## 2025-02-26 NOTE — PROGRESS NOTES
HISTORY OF PRESENT ILLNESS  Jairon Rice is a 80 y.o. male.   has a past medical history of Hypercholesterolemia and Hypertension.  has a past surgical history that includes heent and Prostate surgery (11/10/2022).  Chief Complaint   Patient presents with    6 Month Follow-Up     He has a               Component  Ref Range & Units 1/28/25 1308 8/7/24 1146 11/3/23 1122 5/24/23 1054 11/10/22 1654   PSA  0.0 - 4.0 ng/mL 5.9 High  5.4 High  CM 4.2 High  CM 4.7 High  CM 10.5 High  CM     1. Elevated PSA  Overview:  11/10/22 10.5  8/10/22 PSA 6.9  He reports PSA 5.1 on 1/2022.   MRI 10/22/22: No evidence of intermediate or high-grade prostate adenocarcinoma. Enlarged prostate with benign appearing prostate hypertrophy.  He is s/p prostate biopsy on 11/10/22.  Pathology significant for 2 cores of HGPIN and one core with ASAP.  PSA was 10.5 same date.  5/24/23: 4.7  11/3/23: 4.2  8/7/24: 5.4  1/28/25: 5.9  Assessment & Plan:  PSA rising, on 5ARI.  Low free PSA.  We will check a prostate MRI and strongly consider a prostate biopsy.    Orders:  -     MRI PELVIS W WO CONTRAST; Future  -     PSA, Diagnostic; Future  2. Atypical small acinar proliferation of prostate  Overview:  He is s/p prostate biopsy on 11/10/22.  Pathology significant for 2 cores of HGPIN and one core with ASAP.    Orders:  -     MRI PELVIS W WO CONTRAST; Future  -     PSA, Diagnostic; Future  3. PIN (prostatic intraepithelial neoplasia)  Overview:  He is s/p prostate biopsy on 11/10/22.  Pathology significant for 2 cores of HGPIN and one core with ASAP.    Orders:  -     MRI PELVIS W WO CONTRAST; Future  -     PSA, Diagnostic; Future  4. Benign prostatic hyperplasia without lower urinary tract symptoms  Overview:  Large prostate, voiding better on finasteride but noted sexual dysfunction. He tried tamsulosin instead, but could not tolerate it either and elected to continue with finasteride.  Assessment & Plan:   Nocturia x1 on finasteride.  Continue

## 2025-02-26 NOTE — ASSESSMENT & PLAN NOTE
PSA rising, on 5ARI.  Low free PSA.  We will check a prostate MRI and strongly consider a prostate biopsy.

## 2025-02-26 NOTE — PROGRESS NOTES
Chief Complaint   Patient presents with    6 Month Follow-Up     1. Have you been to the ER, urgent care clinic since your last visit?  Hospitalized since your last visit?No    2. Have you seen or consulted any other health care providers outside of the [facility] since your last visit?  Include any pap smears or colon screening. No  BP (!) 153/73   Pulse 58   Ht 1.829 m (6')   Wt 101.2 kg (223 lb)   BMI 30.24 kg/m²

## 2025-03-03 DIAGNOSIS — N40.0 BENIGN PROSTATIC HYPERPLASIA WITHOUT LOWER URINARY TRACT SYMPTOMS: ICD-10-CM

## 2025-03-03 RX ORDER — FINASTERIDE 5 MG/1
5 TABLET, FILM COATED ORAL DAILY
Qty: 90 TABLET | Refills: 3 | Status: SHIPPED | OUTPATIENT
Start: 2025-03-03

## 2025-08-05 LAB — PSA SERPL-MCNC: 7.8 NG/ML (ref 0–4)

## 2025-08-26 DIAGNOSIS — N40.0 BENIGN PROSTATIC HYPERPLASIA WITHOUT LOWER URINARY TRACT SYMPTOMS: ICD-10-CM

## 2025-08-26 DIAGNOSIS — N42.31 PIN (PROSTATIC INTRAEPITHELIAL NEOPLASIA): ICD-10-CM

## 2025-08-26 DIAGNOSIS — R97.20 ELEVATED PSA: ICD-10-CM

## 2025-08-26 DIAGNOSIS — N42.32 ATYPICAL SMALL ACINAR PROLIFERATION OF PROSTATE: ICD-10-CM

## 2025-08-27 ENCOUNTER — OFFICE VISIT (OUTPATIENT)
Age: 80
End: 2025-08-27
Payer: MEDICARE

## 2025-08-27 VITALS
BODY MASS INDEX: 30.66 KG/M2 | DIASTOLIC BLOOD PRESSURE: 82 MMHG | WEIGHT: 226.4 LBS | HEIGHT: 72 IN | SYSTOLIC BLOOD PRESSURE: 176 MMHG | HEART RATE: 56 BPM | OXYGEN SATURATION: 97 %

## 2025-08-27 DIAGNOSIS — N40.0 BENIGN PROSTATIC HYPERPLASIA WITHOUT LOWER URINARY TRACT SYMPTOMS: ICD-10-CM

## 2025-08-27 DIAGNOSIS — R35.1 NOCTURIA: ICD-10-CM

## 2025-08-27 DIAGNOSIS — N42.31 PIN (PROSTATIC INTRAEPITHELIAL NEOPLASIA): ICD-10-CM

## 2025-08-27 DIAGNOSIS — N42.32 ATYPICAL SMALL ACINAR PROLIFERATION OF PROSTATE: ICD-10-CM

## 2025-08-27 DIAGNOSIS — R97.20 ELEVATED PSA: Primary | ICD-10-CM

## 2025-08-27 PROCEDURE — G8427 DOCREV CUR MEDS BY ELIG CLIN: HCPCS | Performed by: UROLOGY

## 2025-08-27 PROCEDURE — 1125F AMNT PAIN NOTED PAIN PRSNT: CPT | Performed by: UROLOGY

## 2025-08-27 PROCEDURE — 1123F ACP DISCUSS/DSCN MKR DOCD: CPT | Performed by: UROLOGY

## 2025-08-27 PROCEDURE — G8417 CALC BMI ABV UP PARAM F/U: HCPCS | Performed by: UROLOGY

## 2025-08-27 PROCEDURE — 1159F MED LIST DOCD IN RCRD: CPT | Performed by: UROLOGY

## 2025-08-27 PROCEDURE — 99214 OFFICE O/P EST MOD 30 MIN: CPT | Performed by: UROLOGY

## 2025-08-27 PROCEDURE — 1036F TOBACCO NON-USER: CPT | Performed by: UROLOGY

## 2025-08-27 RX ORDER — ALFUZOSIN HYDROCHLORIDE 10 MG/1
10 TABLET, EXTENDED RELEASE ORAL DAILY
Qty: 30 TABLET | Refills: 2 | Status: SHIPPED | OUTPATIENT
Start: 2025-08-27 | End: 2026-08-27